# Patient Record
Sex: FEMALE | Race: NATIVE HAWAIIAN OR OTHER PACIFIC ISLANDER | NOT HISPANIC OR LATINO | Employment: UNEMPLOYED | URBAN - METROPOLITAN AREA
[De-identification: names, ages, dates, MRNs, and addresses within clinical notes are randomized per-mention and may not be internally consistent; named-entity substitution may affect disease eponyms.]

---
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
## 2024-01-18 ENCOUNTER — MYC MEDICAL ADVICE (OUTPATIENT)
Dept: PEDIATRICS | Facility: CLINIC | Age: 19
End: 2024-01-18
Payer: COMMERCIAL

## 2024-06-29 ENCOUNTER — HEALTH MAINTENANCE LETTER (OUTPATIENT)
Age: 19
End: 2024-06-29

## 2024-08-09 ENCOUNTER — TRANSFERRED RECORDS (OUTPATIENT)
Dept: HEALTH INFORMATION MANAGEMENT | Facility: CLINIC | Age: 19
End: 2024-08-09
Payer: COMMERCIAL

## 2025-01-07 ENCOUNTER — TRANSFERRED RECORDS (OUTPATIENT)
Dept: HEALTH INFORMATION MANAGEMENT | Facility: CLINIC | Age: 20
End: 2025-01-07
Payer: COMMERCIAL

## 2025-07-12 ENCOUNTER — HEALTH MAINTENANCE LETTER (OUTPATIENT)
Age: 20
End: 2025-07-12

## 2025-08-05 ENCOUNTER — TRANSFERRED RECORDS (OUTPATIENT)
Dept: MULTI SPECIALTY CLINIC | Facility: CLINIC | Age: 20
End: 2025-08-05
Payer: COMMERCIAL

## 2025-08-05 LAB
ALT SERPL-CCNC: 28 IU/L (ref 0–32)
AST SERPL-CCNC: 27 IU/L (ref 0–40)
CREATININE (EXTERNAL): 0.75 MG/DL (ref 0.57–1)
GFR ESTIMATED (EXTERNAL): 116 ML/MIN/1.73
GLUCOSE (EXTERNAL): 69 MG/DL (ref 70–99)
POTASSIUM (EXTERNAL): 4.8 MMOL/L (ref 3.5–5.2)

## 2025-08-06 ENCOUNTER — TRANSFERRED RECORDS (OUTPATIENT)
Dept: ADMINISTRATIVE | Facility: CLINIC | Age: 20
End: 2025-08-06
Payer: COMMERCIAL

## 2025-08-12 ENCOUNTER — TRANSFERRED RECORDS (OUTPATIENT)
Dept: ADMINISTRATIVE | Facility: CLINIC | Age: 20
End: 2025-08-12
Payer: COMMERCIAL

## 2025-08-22 ENCOUNTER — TRANSFERRED RECORDS (OUTPATIENT)
Dept: ADMINISTRATIVE | Facility: CLINIC | Age: 20
End: 2025-08-22
Payer: COMMERCIAL

## 2025-08-25 ENCOUNTER — TRANSFERRED RECORDS (OUTPATIENT)
Dept: ADMINISTRATIVE | Facility: CLINIC | Age: 20
End: 2025-08-25
Payer: COMMERCIAL

## 2025-08-26 ENCOUNTER — TRANSFERRED RECORDS (OUTPATIENT)
Dept: ADMINISTRATIVE | Facility: CLINIC | Age: 20
End: 2025-08-26
Payer: COMMERCIAL

## 2025-09-03 ENCOUNTER — TRANSFERRED RECORDS (OUTPATIENT)
Dept: ADMINISTRATIVE | Facility: CLINIC | Age: 20
End: 2025-09-03
Payer: COMMERCIAL